# Patient Record
Sex: FEMALE | Race: WHITE | NOT HISPANIC OR LATINO | Employment: PART TIME | URBAN - METROPOLITAN AREA
[De-identification: names, ages, dates, MRNs, and addresses within clinical notes are randomized per-mention and may not be internally consistent; named-entity substitution may affect disease eponyms.]

---

## 2021-07-03 ENCOUNTER — HOSPITAL ENCOUNTER (EMERGENCY)
Facility: HOSPITAL | Age: 41
Discharge: HOME | End: 2021-07-03
Attending: EMERGENCY MEDICINE | Admitting: EMERGENCY MEDICINE
Payer: COMMERCIAL

## 2021-07-03 VITALS
HEIGHT: 65 IN | BODY MASS INDEX: 48.82 KG/M2 | RESPIRATION RATE: 20 BRPM | OXYGEN SATURATION: 97 % | SYSTOLIC BLOOD PRESSURE: 148 MMHG | DIASTOLIC BLOOD PRESSURE: 80 MMHG | WEIGHT: 293 LBS | HEART RATE: 74 BPM | TEMPERATURE: 98.3 F

## 2021-07-03 DIAGNOSIS — L55.0 SUNBURN OF FIRST DEGREE: Primary | ICD-10-CM

## 2021-07-03 PROCEDURE — 99283 EMERGENCY DEPT VISIT LOW MDM: CPT | Performed by: EMERGENCY MEDICINE

## 2021-07-03 PROCEDURE — 99283 EMERGENCY DEPT VISIT LOW MDM: CPT

## 2021-07-03 RX ORDER — HYDROCODONE BITARTRATE AND ACETAMINOPHEN 5; 325 MG/1; MG/1
1 TABLET ORAL EVERY 6 HOURS PRN
Qty: 6 TABLET | Refills: 0 | Status: SHIPPED | OUTPATIENT
Start: 2021-07-03 | End: 2021-07-06

## 2021-07-03 RX ORDER — TITANIUM DIOXIDE/OXYBEN/CINOX
CREAM (GRAM) TOPICAL 3 TIMES DAILY
Qty: 105 G | Refills: 0 | Status: SHIPPED | OUTPATIENT
Start: 2021-07-03

## 2021-07-03 RX ORDER — OXYCODONE HYDROCHLORIDE AND ACETAMINOPHEN 5; 325 MG/1; MG/1
1 TABLET ORAL ONCE
Status: COMPLETED | OUTPATIENT
Start: 2021-07-03 | End: 2021-07-03

## 2021-07-03 RX ADMIN — OXYCODONE HYDROCHLORIDE AND ACETAMINOPHEN 1 TABLET: 5; 325 TABLET ORAL at 03:09

## 2021-07-03 NOTE — ED NOTES
Patient reports that she got sunburn on Tuesday and tonight feels like "Bugs are crawling on her"  She reports she has used OTC sunburn treatments but "cannot stand this crawling feeling"     Momo Rodgers, DAVID  07/03/21 8193

## 2021-07-03 NOTE — ED PROVIDER NOTES
History  Chief Complaint   Patient presents with    Sunburn     40 y/o female presents with sunburn noted to her abdomen,chest and upper and lower extremities  Went out in the sun however forgot her sunscreen  Is in pain  No other complaints  History provided by:  Patient   used: No        None       History reviewed  No pertinent past medical history  History reviewed  No pertinent surgical history  History reviewed  No pertinent family history  I have reviewed and agree with the history as documented  E-Cigarette/Vaping    E-Cigarette Use Never User      E-Cigarette/Vaping Substances     Social History     Tobacco Use    Smoking status: Never Smoker    Smokeless tobacco: Never Used   Vaping Use    Vaping Use: Never used   Substance Use Topics    Alcohol use: Never    Drug use: Never       Review of Systems   Constitutional: Negative  HENT: Negative  Eyes: Negative  Respiratory: Negative  Cardiovascular: Negative  Gastrointestinal: Negative  Endocrine: Negative  Genitourinary: Negative  Musculoskeletal: Negative  Skin: Positive for rash  Allergic/Immunologic: Negative  Neurological: Negative  Hematological: Negative  Psychiatric/Behavioral: Negative  All other systems reviewed and are negative  Physical Exam  Physical Exam  Constitutional:       Appearance: Normal appearance  HENT:      Head: Normocephalic and atraumatic  Nose: Nose normal       Mouth/Throat:      Mouth: Mucous membranes are moist    Eyes:      Extraocular Movements: Extraocular movements intact  Pupils: Pupils are equal, round, and reactive to light  Cardiovascular:      Rate and Rhythm: Normal rate and regular rhythm  Pulmonary:      Effort: Pulmonary effort is normal       Breath sounds: Normal breath sounds  Abdominal:      General: Abdomen is flat  Bowel sounds are normal       Palpations: Abdomen is soft     Musculoskeletal: General: Normal range of motion  Cervical back: Normal range of motion and neck supple  Skin:     General: Skin is warm  Capillary Refill: Capillary refill takes less than 2 seconds  Comments: Sunburn noted abdomen, chest, upper and lower extremities, smooth confluent red rash first degree   Neurological:      General: No focal deficit present  Mental Status: She is alert and oriented to person, place, and time  Mental status is at baseline  Psychiatric:         Mood and Affect: Mood normal          Thought Content: Thought content normal          Vital Signs  ED Triage Vitals [07/03/21 0159]   Temperature Pulse Respirations Blood Pressure SpO2   98 3 °F (36 8 °C) 74 20 148/80 97 %      Temp Source Heart Rate Source Patient Position - Orthostatic VS BP Location FiO2 (%)   Oral Monitor Sitting Right arm --      Pain Score       Worst Possible Pain           Vitals:    07/03/21 0159   BP: 148/80   Pulse: 74   Patient Position - Orthostatic VS: Sitting         Visual Acuity      ED Medications  Medications   oxyCODONE-acetaminophen (PERCOCET) 5-325 mg per tablet 1 tablet (1 tablet Oral Given 7/3/21 0309)       Diagnostic Studies  Results Reviewed     None                 No orders to display              Procedures  Procedures         ED Course                                           MDM  Number of Diagnoses or Management Options  Patient Progress  Patient progress: stable      Disposition  Final diagnoses:   Sunburn of first degree     Time reflects when diagnosis was documented in both MDM as applicable and the Disposition within this note     Time User Action Codes Description Comment    7/3/2021  3:06 AM Monique Desir Add [L55 0] Sunburn of first degree       ED Disposition     ED Disposition Condition Date/Time Comment    Discharge Stable Sat Jul 3, 2021  3:06 AM Laura Flaherty discharge to home/self care              Follow-up Information     Follow up With Specialties Details Why Contact Info Additional Information    Tete Monteiro MD  Schedule an appointment as soon as possible for a visit   Lee VALERIO  [phone]       [facility] Emergency Department Emergency Medicine  If symptoms worsen [address] [place] [address] Emergency Department, [place], [place], [address]          Discharge Medication List as of 7/3/2021  3:16 AM      START taking these medications    Details   Emollient (Aquaderm Treatment/Moisturizer) 25-0 03-0 1 % LOTN Apply topically 3 (three) times a day, Starting Sat 7/3/2021, Print      HYDROcodone-acetaminophen (NORCO) 5-325 mg per tablet Take 1 tablet by mouth every 6 (six) hours as needed for pain for up to 3 daysMax Daily Amount: 4 tablets, Starting Sat 7/3/2021, Until Tue 7/6/2021 at 2359, Print           No discharge procedures on file      PDMP Review     None          ED Provider  Electronically Signed by           Andrew Ramirez DO  07/07/21 3205